# Patient Record
Sex: MALE | Race: WHITE | NOT HISPANIC OR LATINO | Employment: FULL TIME | URBAN - METROPOLITAN AREA
[De-identification: names, ages, dates, MRNs, and addresses within clinical notes are randomized per-mention and may not be internally consistent; named-entity substitution may affect disease eponyms.]

---

## 2020-04-13 ENCOUNTER — HOSPITAL ENCOUNTER (EMERGENCY)
Facility: HOSPITAL | Age: 35
Discharge: HOME | End: 2020-04-13
Attending: EMERGENCY MEDICINE
Payer: OTHER GOVERNMENT

## 2020-04-13 VITALS
HEIGHT: 71 IN | BODY MASS INDEX: 18.2 KG/M2 | RESPIRATION RATE: 18 BRPM | SYSTOLIC BLOOD PRESSURE: 151 MMHG | WEIGHT: 130 LBS | OXYGEN SATURATION: 98 % | TEMPERATURE: 98 F | HEART RATE: 79 BPM | DIASTOLIC BLOOD PRESSURE: 99 MMHG

## 2020-04-13 DIAGNOSIS — R05.9 COUGH: ICD-10-CM

## 2020-04-13 DIAGNOSIS — J06.9 VIRAL URI WITH COUGH: Primary | ICD-10-CM

## 2020-04-13 DIAGNOSIS — J20.9 ACUTE BRONCHITIS, UNSPECIFIED ORGANISM: ICD-10-CM

## 2020-04-13 DIAGNOSIS — R50.9 FEVER, UNSPECIFIED FEVER CAUSE: ICD-10-CM

## 2020-04-13 LAB — SARS-COV-2 RDRP RESP QL NAA+PROBE: NEGATIVE

## 2020-04-13 PROCEDURE — U0002 COVID-19 LAB TEST NON-CDC: HCPCS

## 2020-04-13 PROCEDURE — 99283 EMERGENCY DEPT VISIT LOW MDM: CPT | Mod: 25

## 2020-04-13 RX ORDER — AZITHROMYCIN 250 MG/1
250 TABLET, FILM COATED ORAL DAILY
Qty: 6 TABLET | Refills: 0 | Status: SHIPPED | OUTPATIENT
Start: 2020-04-13

## 2020-04-13 RX ORDER — BENZONATATE 100 MG/1
100 CAPSULE ORAL 3 TIMES DAILY PRN
Qty: 20 CAPSULE | Refills: 0 | Status: SHIPPED | OUTPATIENT
Start: 2020-04-13 | End: 2020-04-23

## 2020-04-13 NOTE — DISCHARGE INSTRUCTIONS
Your test was NEGATIVE for COVID-19 (coronavirus).  If you still have symptoms, treat with rest, fluids, and over-the-counter medications.  Continue to stay home and practice proper handwashing.     If your symptoms worsen or if you have any other concerns, please contact [facility] at [phone].     Sincerely,    Harjeet Soni MD

## 2020-04-13 NOTE — ED PROVIDER NOTES
Encounter Date: 4/13/2020       History     Chief Complaint   Patient presents with    Fever     body aches    Cough     34-year-old male with past medical history significant of asthma presents with cough and fever x2 days.  Patient states she noticed a cough episodes 1st and by his employment being a break [employer] and around several people he decided to come to emergency room for further assessment to rule out COVID-19.  Patient reports when he woke this morning he had episodes of sweats.  But he denies any chest pain, shortness of breath, nausea, vomiting, diarrhea.  He is otherwise stable and has no other complaints.        Review of patient's allergies indicates:  No Known Allergies  No past medical history on file.  No past surgical history on file.  No family history on file.  Social History     Tobacco Use    Smoking status: Not on file   Substance Use Topics    Alcohol use: Not on file    Drug use: Not on file     Review of Systems   Constitutional: Positive for diaphoresis and fever.   HENT: Negative for sore throat.    Respiratory: Positive for cough. Negative for shortness of breath.    Cardiovascular: Negative for chest pain.   Gastrointestinal: Negative for nausea.   Genitourinary: Negative for dysuria.   Musculoskeletal: Negative for back pain.   Skin: Negative for rash.   Neurological: Negative for weakness.   Hematological: Does not bruise/bleed easily.   All other systems reviewed and are negative.      Physical Exam     Initial Vitals [04/13/20 0451]   BP Pulse Resp Temp SpO2   (!) 158/85 102 18 98.7 °F (37.1 °C) 96 %      MAP       --         Physical Exam    Nursing note and vitals reviewed.  Constitutional: He appears well-developed and well-nourished. He is not diaphoretic. He appears distressed.   HENT:   Head: Normocephalic and atraumatic.   Mouth/Throat: Oropharynx is clear and moist.   Eyes: Conjunctivae and EOM are normal. Pupils are equal, round, and reactive to light.   Neck:  Normal range of motion. Neck supple. No thyromegaly present. No JVD present.   Cardiovascular: Normal rate, regular rhythm and normal heart sounds. Exam reveals no gallop and no friction rub.    No murmur heard.  Pulmonary/Chest: Breath sounds normal. No respiratory distress. He has no wheezes. He exhibits no tenderness.   Abdominal: Soft. Bowel sounds are normal. He exhibits no distension. There is no tenderness. There is no rebound and no guarding.   Musculoskeletal: Normal range of motion. He exhibits no edema or tenderness.   Neurological: He is alert and oriented to person, place, and time. He has normal strength. No cranial nerve deficit or sensory deficit.   Skin: Skin is warm and dry. Capillary refill takes less than 2 seconds. No rash noted. No erythema.   Psychiatric: He has a normal mood and affect.         ED Course   Procedures  Labs Reviewed   SARS-COV-2 RNA AMPLIFICATION, QUAL    Narrative:     What symptom criteria does the patient meet?->Fever  What symptom criteria does the patient meet?->Cough          Imaging Results          X-Ray Chest AP Portable (Final result)  Result time 04/13/20 05:18:17    Final result by Seth Field MD (04/13/20 05:18:17)                 Impression:      1. No acute radiographic abnormalities.      Electronically signed by: Seth Field MD  Date:    04/13/2020  Time:    05:18             Narrative:    EXAMINATION:  XR CHEST AP PORTABLE    CLINICAL HISTORY:  Suspected COVID-19 infection    COMPARISON:  None.    FINDINGS:  Two AP views of the chest demonstrate no cardiac, pulmonary, or osseous abnormalities.                                 Medical Decision Making:   Initial Assessment:   34-year-old male initial assessment in moderate distress secondary to cough and generalized fatigue.  Patient is alert and oriented x3, neurologically and neurovascular intact no focal deficits.  He is nontoxic appearing and vitals are stable at this time.  Differential  Diagnosis:   COVID-19, pneumonia, URI, asthma exacerbation  Independently Interpreted Test(s):   I have ordered and independently interpreted X-rays - see prior notes.  Clinical Tests:   Lab Tests: Ordered and Reviewed  Radiological Study: Ordered and Reviewed  ED Management:  Patient has been reassessed noted to have no acute changes condition.  Labs unremarkable for any acute pathology including COVID-19 at this time.  Chest x-ray shows no pulmonary infiltrate and patient has remained stable while in the ED.  He will receive azithromycin and Tessalon Perles for his symptoms and given probable cautions on when to return to emergency room if his symptoms become worse.  Mr. Rey is aware of the plan and agreement with discharge.                                 Clinical Impression:       ICD-10-CM ICD-9-CM   1. Viral URI with cough J06.9 465.9    B97.89    2. Cough R05 786.2   3. Fever, unspecified fever cause R50.9 780.60   4. Acute bronchitis, unspecified organism J20.9 466.0             ED Disposition Condition    Discharge Stable        ED Prescriptions     Medication Sig Dispense Start Date End Date Auth. Provider    azithromycin (Z-GIOVANNI) 250 MG tablet Take 1 tablet (250 mg total) by mouth once daily. Take first 2 tablets together, then 1 every day until finished. 6 tablet 4/13/2020  Harjeet Soni MD    benzonatate (TESSALON) 100 MG capsule Take 1 capsule (100 mg total) by mouth 3 (three) times daily as needed for Cough. 20 capsule 4/13/2020 4/23/2020 Harjeet Soni MD        Follow-up Information     Follow up With Specialties Details Why Contact Info Additional Information    [facility] Emergency Medicine  As needed, If symptoms worsen [address] [facility] [id number] [phone] 1st floor                                     Harjeet Soni MD  04/13/20 1487

## 2024-02-23 PROCEDURE — 26770 TREAT FINGER DISLOCATION: CPT | Mod: F1

## 2024-02-23 PROCEDURE — 99283 EMERGENCY DEPT VISIT LOW MDM: CPT

## 2024-02-24 ENCOUNTER — HOSPITAL ENCOUNTER (EMERGENCY)
Facility: HOSPITAL | Age: 39
Discharge: HOME | End: 2024-02-24
Attending: EMERGENCY MEDICINE

## 2024-02-24 VITALS
TEMPERATURE: 99 F | WEIGHT: 140 LBS | HEART RATE: 96 BPM | HEIGHT: 70 IN | SYSTOLIC BLOOD PRESSURE: 163 MMHG | OXYGEN SATURATION: 100 % | DIASTOLIC BLOOD PRESSURE: 86 MMHG | RESPIRATION RATE: 18 BRPM | BODY MASS INDEX: 20.04 KG/M2
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
## 2024-02-24 DIAGNOSIS — S63.281A DISLOCATION OF PROXIMAL INTERPHALANGEAL JOINT OF LEFT INDEX FINGER, INITIAL ENCOUNTER: Primary | ICD-10-CM

## 2024-02-24 PROCEDURE — 25000003 PHARM REV CODE 250: Performed by: STUDENT IN AN ORGANIZED HEALTH CARE EDUCATION/TRAINING PROGRAM

## 2024-02-24 RX ORDER — LORAZEPAM 1 MG/1
1 TABLET ORAL
Status: COMPLETED | OUTPATIENT
Start: 2024-02-24 | End: 2024-02-24

## 2024-02-24 RX ORDER — LIDOCAINE HYDROCHLORIDE 10 MG/ML
10 INJECTION, SOLUTION EPIDURAL; INFILTRATION; INTRACAUDAL; PERINEURAL
Status: DISCONTINUED | OUTPATIENT
Start: 2024-02-24 | End: 2024-02-24 | Stop reason: HOSPADM

## 2024-02-24 RX ADMIN — LORAZEPAM 1 MG: 1 TABLET ORAL at 01:02

## 2024-02-24 NOTE — ED PROVIDER NOTES
Encounter Date: 2/23/2024       History     Chief Complaint   Patient presents with    Hand Pain     Patient states that he fell, injuring his left pointer finger. Patient c/o pain to area     HPI  Patient is a 38-year-old right-handed male here with left index finger pain after a fall.  He states that he tripped and fell down steps, denies loss of consciousness or hitting his head.  He denies pain or injury elsewhere.    Review of patient's allergies indicates:  No Known Allergies  No past medical history on file.  No past surgical history on file.  No family history on file.     Review of Systems   All other systems reviewed and are negative.      Physical Exam     Initial Vitals [02/24/24 0044]   BP Pulse Resp Temp SpO2   (!) 145/95 101 18 98.5 °F (36.9 °C) 98 %      MAP       --         Physical Exam    Nursing note and vitals reviewed.  Constitutional: He is not diaphoretic.  Non-toxic appearance.   HENT:   Head: Normocephalic and atraumatic.   Eyes: EOM are normal.   Neck: Neck supple.   Normal range of motion.  Cardiovascular:  Normal rate, regular rhythm and normal heart sounds.           Pulmonary/Chest: Breath sounds normal. No respiratory distress.   Abdominal: Abdomen is soft. He exhibits no distension. There is no abdominal tenderness. There is no guarding.   Musculoskeletal:      Cervical back: Normal range of motion and neck supple.      Comments: Left index finger with obvious deformity, unable to flex or extend at PIP.     Neurological: He is alert and oriented to person, place, and time.   Moves all extremities well and equally. Cranial nerves 2-12 grossly intact.  Sensation to light touch intact in all digits of left hand   Skin: Skin is warm and dry.   Psychiatric: He has a normal mood and affect. Thought content normal.         ED Course   Nerve Block    Date/Time: 2/24/2024 3:59 AM  Location procedure was performed: [facility] EMERGENCY DEPARTMENT    Performed by: Yue Hernandez,  MD  Authorized by: Chris Gu MD  Indications: dislocation  Body area: upper extremity  Nerve: digital  Patient position: sitting  Needle size: 25 G  Location technique: anatomical landmarks  Local Anesthetic: lidocaine 1% without epinephrine  Outcome: pain improved  Patient tolerance: Patient tolerated the procedure well with no immediate complications      Orthopedic Injury    Date/Time: 2/24/2024 4:00 AM    Performed by: Yue Hernandez MD  Authorized by: Chris Gu MD    Location procedure was performed:  [facility] EMERGENCY DEPARTMENT  Injury:     Injury location:  Finger    Location details:  Left index finger    Injury type:  Dislocation    Dislocation type: PIP        Pre-procedure assessment:     Neurovascular status: Neurovascularly intact      Distal perfusion: normal      Neurological function: normal      Range of motion: reduced        Selections made in this section will also lock the Injury type section above.:     Manipulation performed?: Yes      Reduction successful?: Yes      Immobilization:  Splint    Splint type:  Static finger    Supplies used:  Aluminum splint  Post-procedure assessment:     Neurovascular status: Neurovascularly intact      Distal perfusion: normal      Neurological function: normal      Range of motion: improved      Patient tolerance:  Patient tolerated the procedure well with no immediate complications    Labs Reviewed - No data to display       Imaging Results              X-Ray Hand 3 View Left (In process)    Procedure changed from X-Ray Hand 2 View Left                    Medications   LIDOcaine (PF) 10 mg/ml (1%) injection 100 mg (has no administration in time range)   LORazepam tablet 1 mg (1 mg Oral Given 2/24/24 0156)     Medical Decision Making  Patient is a 38-year-old right-handed male here with left index finger pain after a fall.  He is nontoxic appearing, GCS 15, hemodynamically stable and in no respiratory distress.  Plain film of  left hand shows dislocation of 2nd digit PIP joint on my independent interpretation, no other fracture or dislocation noted.  Patient given Ativan for anxiety, digital block performed and finger reduced at bedside, placed in an aluminum splint.  Patient is stable for discharge with orthopedic surgery follow-up in 2 weeks, instructed to maintain finger in splint until then.  Strict return precautions issued.  Patient verbalized understanding and agrees with plan.    Problems Addressed:  Dislocation of proximal interphalangeal joint of left index finger, initial encounter: acute illness or injury  Fall: acute illness or injury    Amount and/or Complexity of Data Reviewed  Radiology: ordered and independent interpretation performed. Decision-making details documented in ED Course.    Risk  Prescription drug management.              Attending Attestation:   Physician Attestation Statement for Resident:  As the supervising MD   I agree with the above history.  -:   As the supervising MD I agree with the above PE.     As the supervising MD I agree with the above treatment, course, plan, and disposition.   I was personally present during the critical portions of the procedure(s) performed by the resident and was immediately available in the ED to provide services and assistance as needed during the entire procedure.  I have reviewed and agree with the residents interpretation of the following: x-rays.                                        Clinical Impression:  Final diagnoses:  [W19.XXXA] Fall  [S63.281A] Dislocation of proximal interphalangeal joint of left index finger, initial encounter (Primary)          ED Disposition Condition    Discharge Stable          ED Prescriptions    None       Follow-up Information       Follow up With Specialties Details Why Contact Info    Christopher Ellison MD Orthopedic Surgery, Surgery Schedule an appointment as soon as possible for a visit in 2 weeks For follow up for your hand [id number] [facility]  [address]  [facility] [address]  [phone]               Yue Hernandez MD  Resident  02/24/24 0401       Chris Gu MD  02/24/24 [id number]

## 2024-02-24 NOTE — DISCHARGE INSTRUCTIONS
Your x-ray today showed a dislocation of your index finger. Please keep your splint in place until you are seen by a hand surgeon.    Please return to the ER if you have any of the following:  - Numbness in your hand or finger  - If your finger becomes pale  - Severe pain not controlled by your medications at home  - Worsening swelling or redness in your finger or hand  - Any new or worsening symptoms